# Patient Record
Sex: FEMALE | Race: WHITE | NOT HISPANIC OR LATINO | URBAN - METROPOLITAN AREA
[De-identification: names, ages, dates, MRNs, and addresses within clinical notes are randomized per-mention and may not be internally consistent; named-entity substitution may affect disease eponyms.]

---

## 2017-01-11 ENCOUNTER — OFFICE (AMBULATORY)
Dept: URBAN - METROPOLITAN AREA CLINIC 75 | Facility: CLINIC | Age: 27
End: 2017-01-11

## 2017-01-11 VITALS
SYSTOLIC BLOOD PRESSURE: 120 MMHG | WEIGHT: 178 LBS | HEART RATE: 69 BPM | HEIGHT: 72 IN | DIASTOLIC BLOOD PRESSURE: 72 MMHG

## 2017-01-11 DIAGNOSIS — D68.62 LUPUS ANTICOAGULANT SYNDROME: ICD-10-CM

## 2017-01-11 DIAGNOSIS — K50.90 CROHN'S DISEASE, UNSPECIFIED, WITHOUT COMPLICATIONS: ICD-10-CM

## 2017-01-11 DIAGNOSIS — Z79.01 LONG TERM (CURRENT) USE OF ANTICOAGULANTS: ICD-10-CM

## 2017-01-11 PROCEDURE — 99213 OFFICE O/P EST LOW 20 MIN: CPT | Performed by: INTERNAL MEDICINE

## 2017-01-11 RX ORDER — MESALAMINE 500 MG/1
2000 CAPSULE ORAL
Qty: 180 | Refills: 1 | Status: COMPLETED
End: 2017-01-11

## 2017-01-11 RX ORDER — MESALAMINE 1.2 G/1
2.4 TABLET, DELAYED RELEASE ORAL
Qty: 60 | Refills: 5 | Status: COMPLETED
Start: 2017-01-11 | End: 2017-07-19

## 2017-07-19 ENCOUNTER — OFFICE (AMBULATORY)
Dept: URBAN - METROPOLITAN AREA CLINIC 75 | Facility: CLINIC | Age: 27
End: 2017-07-19
Payer: COMMERCIAL

## 2017-07-19 VITALS
SYSTOLIC BLOOD PRESSURE: 122 MMHG | WEIGHT: 177 LBS | DIASTOLIC BLOOD PRESSURE: 72 MMHG | HEART RATE: 86 BPM | HEIGHT: 72 IN

## 2017-07-19 DIAGNOSIS — Z79.01 LONG TERM (CURRENT) USE OF ANTICOAGULANTS: ICD-10-CM

## 2017-07-19 DIAGNOSIS — K50.90 CROHN'S DISEASE, UNSPECIFIED, WITHOUT COMPLICATIONS: ICD-10-CM

## 2017-07-19 DIAGNOSIS — D68.62 LUPUS ANTICOAGULANT SYNDROME: ICD-10-CM

## 2017-07-19 PROCEDURE — 99213 OFFICE O/P EST LOW 20 MIN: CPT | Performed by: INTERNAL MEDICINE

## 2017-12-19 ENCOUNTER — OFFICE (AMBULATORY)
Dept: URBAN - METROPOLITAN AREA CLINIC 75 | Facility: CLINIC | Age: 27
End: 2017-12-19

## 2017-12-19 VITALS
HEIGHT: 72 IN | SYSTOLIC BLOOD PRESSURE: 118 MMHG | HEART RATE: 84 BPM | WEIGHT: 174 LBS | DIASTOLIC BLOOD PRESSURE: 72 MMHG

## 2017-12-19 DIAGNOSIS — Z79.01 LONG TERM (CURRENT) USE OF ANTICOAGULANTS: ICD-10-CM

## 2017-12-19 DIAGNOSIS — K50.90 CROHN'S DISEASE, UNSPECIFIED, WITHOUT COMPLICATIONS: ICD-10-CM

## 2017-12-19 DIAGNOSIS — D68.62 LUPUS ANTICOAGULANT SYNDROME: ICD-10-CM

## 2017-12-19 PROCEDURE — 99214 OFFICE O/P EST MOD 30 MIN: CPT | Performed by: INTERNAL MEDICINE

## 2017-12-19 NOTE — SERVICEHPINOTES
12/19/2017  OBEY ELLIOTT  [birth year]  1990   was seen in our [facility] office today for a follow up visit.   Comes in with some recently increased symptoms. Is off all of her medicines since June. Have been doing fairly well but not noticed a slight increase in to the isthmus little bit of blood some mucus. No significant abdominal pain. Worried that her symptoms are coming back. She gets  in May. No significant abdominal pain. We reviewed her previous history and previous endoscopic evaluation.      7/19/2017 OBEY ELLIOTT 25 1990 was seen in our [facility] office today for a follow up visit. BRsaw Dr Edouard last month BRdid not tolerate Lialda after 2 weeks {mild constipation/bloating/gas/wt gain} started in place of Pentasa 1/2017 due to cost -- we decided to try IMURAN monotherapy for now on June 1 she self-discontinued the AZA and she feels great better than in many yearsgraduates NP school in 2 weeksBRgetting  May 1239YX8/11/17: I did have a pleasure to see her in the office today she's been doing great she had a bit of a flare last January probably an infection from something that she ate at the time there was concern of listeria infection from some lettuce and she had recently traveled at that time. Since then she's been doing great feeling well. She's busy with school and work and she's not having any bowel issues. No blood in the stool. Weight is stable appetite is good. She tells me that her insurance wants to switch her Pentasa to a different medication and the options are balsalazide, Azulfidine, Apriso or Lialda 7/13/16: I was delighted to see her in the office today she seems to be doing great. Had a bit of a rough patch back in the winter and then insurance did not want to pay for Asacol. Blood work looks great and she seems to be doing well with the Pentasa. Bowel function is good. No abdominal pain and no blood in the stool. Pertinent negative symptoms include abdominal pain, belching, black stools, bloating, change in bowel habits, constipation, diarrhea, difficulty swallowing, painful swallowing, flatulence/rectal gas, heartburn/reflux, mucous in stools, nausea, painful stools, rectal bleeding, rectal protusions, rectal urgency, soiling/incontinence, vomiting. 1/19/16: I did see her in the office today as a work in visit. She's been doing great from the standpoint of her Crohn's disease. Just a few days ago developed severe abdominal pain. This was accompanied by over 20 loose bowel movements. These problems did not have blood the next day with the bowel movements slowed down she diagnosis small amount of mucus and blood on the tissue. She is not seeing any blood since then in [place] much better today. She did eat some sausage and wonders if that could've caused some pain in the bleeding. She has not really had any Crohn's problems for a long time and her colonoscopy in July 2014 looked great. We then slowly dropping down her Imuran dose without any problems. Her last lab work was done to the hematologist last month. She does not recognize any unusual exposures at work she is also going to school for nurse practitioner at [employer] 6/17/15:OBEY ELLIOTT is seen today for a follow-up visit. I was delighted to see her back in the office and she is doing very well. Really no clinical issues since I saw her last. 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 She is on 100 mg of azathioprine and takes 2 doses of Asacol 800 mg twice daily. No signs or symptoms of active Crohn's at this time.Had blood work done last month and tells me her bilirubin is 1.3 but it was 1.4 last year and this is probably because of Gilbert's syndrome BR12/9/15::: OBEY ELLIOTT is seen today for a follow-up visit.Overall doing very well. She has had no evidence of active Crohn's. Her colonoscopy from July looked great. Still, a very small area of active inflammation but 98 percent of her colon looked in remission. We had multiple conversations about continue her azathioprine. Certainly, fertility standpoint in the long-term, we would like to cut down as well as possible perhaps even stopped that. She is now going to be committed to long-term anticoagulation due to her hypercoagulability. Colonoscopy 7/2/145/22/14:::: OBEY ELLIOTT is seen today for a follow-up visit.I was pleased to see her in the office today. She is doing wonderfully well. Her blood work that I did not at her last visit was completely normal. Her bilirubin, however, is 1.4. 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 3/19/14:::CRISSY ELLIOTT is a 23 year old female patient who is seen at the request of [facility] for a consultation/initial visit. 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 She was diagnosed with Crohn's at age 7. She has recently relocated to the [facility] and needs to establish adult GI care. She was previously getting her care from the [facility]. Amazingly, her last colonoscopy was at the time of diagnosis. She was briefly on prednisone, but mainly has been maintained with 5 ASA and Imuran. She has an interesting history of hematologic disorder as outlined below.He is currently asymptomatic from a GI standpoint.She denies any ocular, joint, oral or hepatobiliary manifestations of her IBD. I do not know if she has ever had an inflammatory bowel disease serology. She is nulliparous Pertinent negative symptoms include abdominal pain, abdominal swelling, change in bowel habits, constipation, diarrhea, heartburn, trouble swallowing, red blood in stool, vomiting of blood, rectal pain, jaundice, nausea, incontinence of stool.

## 2018-02-07 VITALS
SYSTOLIC BLOOD PRESSURE: 95 MMHG | SYSTOLIC BLOOD PRESSURE: 92 MMHG | SYSTOLIC BLOOD PRESSURE: 143 MMHG | SYSTOLIC BLOOD PRESSURE: 108 MMHG | RESPIRATION RATE: 24 BRPM | HEART RATE: 64 BPM | RESPIRATION RATE: 13 BRPM | DIASTOLIC BLOOD PRESSURE: 113 MMHG | HEART RATE: 59 BPM | DIASTOLIC BLOOD PRESSURE: 73 MMHG | TEMPERATURE: 99 F | HEART RATE: 57 BPM | OXYGEN SATURATION: 97 % | HEART RATE: 69 BPM | RESPIRATION RATE: 22 BRPM | DIASTOLIC BLOOD PRESSURE: 118 MMHG | DIASTOLIC BLOOD PRESSURE: 57 MMHG | OXYGEN SATURATION: 100 % | OXYGEN SATURATION: 94 % | SYSTOLIC BLOOD PRESSURE: 110 MMHG | HEIGHT: 72 IN | SYSTOLIC BLOOD PRESSURE: 102 MMHG | RESPIRATION RATE: 26 BRPM | DIASTOLIC BLOOD PRESSURE: 50 MMHG | HEART RATE: 63 BPM | RESPIRATION RATE: 23 BRPM | RESPIRATION RATE: 25 BRPM | DIASTOLIC BLOOD PRESSURE: 45 MMHG | WEIGHT: 172 LBS | HEART RATE: 85 BPM | SYSTOLIC BLOOD PRESSURE: 126 MMHG | RESPIRATION RATE: 28 BRPM | DIASTOLIC BLOOD PRESSURE: 80 MMHG | DIASTOLIC BLOOD PRESSURE: 47 MMHG | SYSTOLIC BLOOD PRESSURE: 121 MMHG | RESPIRATION RATE: 16 BRPM | TEMPERATURE: 98.6 F | HEART RATE: 71 BPM | OXYGEN SATURATION: 93 % | HEART RATE: 72 BPM

## 2018-02-09 ENCOUNTER — OFFICE (AMBULATORY)
Dept: URBAN - METROPOLITAN AREA PATHOLOGY 4 | Facility: PATHOLOGY | Age: 28
End: 2018-02-09

## 2018-02-09 ENCOUNTER — AMBULATORY SURGICAL CENTER (AMBULATORY)
Dept: URBAN - METROPOLITAN AREA SURGERY 17 | Facility: SURGERY | Age: 28
End: 2018-02-09
Payer: COMMERCIAL

## 2018-02-09 DIAGNOSIS — K50.10 CROHN'S DISEASE OF LARGE INTESTINE WITHOUT COMPLICATIONS: ICD-10-CM

## 2018-02-09 DIAGNOSIS — K50.90 CROHN'S DISEASE, UNSPECIFIED, WITHOUT COMPLICATIONS: ICD-10-CM

## 2018-02-09 DIAGNOSIS — K52.9 NONINFECTIVE GASTROENTERITIS AND COLITIS, UNSPECIFIED: ICD-10-CM

## 2018-02-09 DIAGNOSIS — K92.2 GASTROINTESTINAL HEMORRHAGE, UNSPECIFIED: ICD-10-CM

## 2018-02-09 DIAGNOSIS — K62.5 HEMORRHAGE OF ANUS AND RECTUM: ICD-10-CM

## 2018-02-09 DIAGNOSIS — D68.62 LUPUS ANTICOAGULANT SYNDROME: ICD-10-CM

## 2018-02-09 DIAGNOSIS — K63.3 ULCER OF INTESTINE: ICD-10-CM

## 2018-02-09 DIAGNOSIS — Z79.01 LONG TERM (CURRENT) USE OF ANTICOAGULANTS: ICD-10-CM

## 2018-02-09 LAB
GI HISTOLOGY: A. SELECT: (no result)
GI HISTOLOGY: B. SELECT: (no result)
GI HISTOLOGY: C. SELECT: (no result)
GI HISTOLOGY: D. SELECT: (no result)
GI HISTOLOGY: E. UNSPECIFIED: (no result)
GI HISTOLOGY: PDF REPORT: (no result)

## 2018-02-09 PROCEDURE — 88305 TISSUE EXAM BY PATHOLOGIST: CPT | Performed by: INTERNAL MEDICINE

## 2018-02-09 PROCEDURE — 45380 COLONOSCOPY AND BIOPSY: CPT | Performed by: INTERNAL MEDICINE

## 2018-02-09 RX ADMIN — PROPOFOL 50 MG: 10 INJECTION, EMULSION INTRAVENOUS at 12:39

## 2018-02-09 RX ADMIN — PROPOFOL 100 MG: 10 INJECTION, EMULSION INTRAVENOUS at 12:39

## 2018-02-09 RX ADMIN — PROPOFOL 25 MG: 10 INJECTION, EMULSION INTRAVENOUS at 12:45

## 2018-02-09 RX ADMIN — PROPOFOL 50 MG: 10 INJECTION, EMULSION INTRAVENOUS at 12:47

## 2018-02-09 RX ADMIN — PROPOFOL 25 MG: 10 INJECTION, EMULSION INTRAVENOUS at 12:40

## 2018-02-09 RX ADMIN — PROPOFOL 50 MG: 10 INJECTION, EMULSION INTRAVENOUS at 12:41

## 2018-02-09 RX ADMIN — PROPOFOL 50 MG: 10 INJECTION, EMULSION INTRAVENOUS at 12:50

## 2018-02-09 RX ADMIN — LIDOCAINE HYDROCHLORIDE 25 MG: 10 INJECTION, SOLUTION EPIDURAL; INFILTRATION; INTRACAUDAL; PERINEURAL at 12:39

## 2018-02-09 RX ADMIN — PROPOFOL 50 MG: 10 INJECTION, EMULSION INTRAVENOUS at 12:43

## 2018-08-15 ENCOUNTER — OFFICE (AMBULATORY)
Dept: URBAN - METROPOLITAN AREA CLINIC 75 | Facility: CLINIC | Age: 28
End: 2018-08-15

## 2018-08-15 VITALS
HEIGHT: 72 IN | SYSTOLIC BLOOD PRESSURE: 118 MMHG | DIASTOLIC BLOOD PRESSURE: 70 MMHG | WEIGHT: 186 LBS | HEART RATE: 72 BPM

## 2018-08-15 DIAGNOSIS — K62.5 HEMORRHAGE OF ANUS AND RECTUM: ICD-10-CM

## 2018-08-15 DIAGNOSIS — D68.62 LUPUS ANTICOAGULANT SYNDROME: ICD-10-CM

## 2018-08-15 DIAGNOSIS — Z79.01 LONG TERM (CURRENT) USE OF ANTICOAGULANTS: ICD-10-CM

## 2018-08-15 DIAGNOSIS — K50.90 CROHN'S DISEASE, UNSPECIFIED, WITHOUT COMPLICATIONS: ICD-10-CM

## 2018-08-15 PROCEDURE — 99215 OFFICE O/P EST HI 40 MIN: CPT | Performed by: INTERNAL MEDICINE

## 2018-08-15 RX ORDER — BUDESONIDE 3 MG/1
9 CAPSULE ORAL
Qty: 90 | Refills: 3 | Status: ACTIVE
Start: 2018-08-15

## 2019-06-04 ENCOUNTER — OFFICE (AMBULATORY)
Dept: URBAN - METROPOLITAN AREA CLINIC 75 | Facility: CLINIC | Age: 29
End: 2019-06-04

## 2019-06-04 VITALS
SYSTOLIC BLOOD PRESSURE: 120 MMHG | DIASTOLIC BLOOD PRESSURE: 68 MMHG | HEIGHT: 72 IN | HEART RATE: 63 BPM | WEIGHT: 185 LBS

## 2019-06-04 DIAGNOSIS — K50.10 CROHN'S DISEASE OF LARGE INTESTINE WITHOUT COMPLICATIONS: ICD-10-CM

## 2019-06-04 DIAGNOSIS — Z79.01 LONG TERM (CURRENT) USE OF ANTICOAGULANTS: ICD-10-CM

## 2019-06-04 DIAGNOSIS — K63.3 ULCER OF INTESTINE: ICD-10-CM

## 2019-06-04 DIAGNOSIS — K62.5 HEMORRHAGE OF ANUS AND RECTUM: ICD-10-CM

## 2019-06-04 DIAGNOSIS — K92.2 GASTROINTESTINAL HEMORRHAGE, UNSPECIFIED: ICD-10-CM

## 2019-06-04 DIAGNOSIS — D68.62 LUPUS ANTICOAGULANT SYNDROME: ICD-10-CM

## 2019-06-04 DIAGNOSIS — Z92.25 PERSONAL HISTORY OF IMMUNOSUPPRESSION THERAPY: ICD-10-CM

## 2019-06-04 PROCEDURE — 99213 OFFICE O/P EST LOW 20 MIN: CPT | Performed by: NURSE PRACTITIONER

## 2019-06-04 RX ORDER — BUDESONIDE 28 MG/1
8 AEROSOL, FOAM RECTAL
Qty: 33.4 | Refills: 6 | Status: COMPLETED
Start: 2019-06-04 | End: 2019-09-18

## 2019-09-18 ENCOUNTER — OFFICE (AMBULATORY)
Dept: URBAN - METROPOLITAN AREA CLINIC 75 | Facility: CLINIC | Age: 29
End: 2019-09-18

## 2019-09-18 VITALS
HEIGHT: 72 IN | SYSTOLIC BLOOD PRESSURE: 119 MMHG | HEART RATE: 67 BPM | DIASTOLIC BLOOD PRESSURE: 78 MMHG | WEIGHT: 188 LBS

## 2019-09-18 DIAGNOSIS — K50.10 CROHN'S DISEASE OF LARGE INTESTINE WITHOUT COMPLICATIONS: ICD-10-CM

## 2019-09-18 DIAGNOSIS — Z79.01 LONG TERM (CURRENT) USE OF ANTICOAGULANTS: ICD-10-CM

## 2019-09-18 DIAGNOSIS — D68.62 LUPUS ANTICOAGULANT SYNDROME: ICD-10-CM

## 2019-09-18 PROCEDURE — 99213 OFFICE O/P EST LOW 20 MIN: CPT | Performed by: INTERNAL MEDICINE

## 2021-05-25 VITALS
DIASTOLIC BLOOD PRESSURE: 66 MMHG | HEART RATE: 92 BPM | HEIGHT: 72 IN | WEIGHT: 184 LBS | TEMPERATURE: 97.5 F | OXYGEN SATURATION: 99 % | SYSTOLIC BLOOD PRESSURE: 118 MMHG

## 2021-05-26 ENCOUNTER — OFFICE (AMBULATORY)
Dept: URBAN - METROPOLITAN AREA CLINIC 75 | Facility: CLINIC | Age: 31
End: 2021-05-26

## 2021-05-26 DIAGNOSIS — Z92.25 PERSONAL HISTORY OF IMMUNOSUPPRESSION THERAPY: ICD-10-CM

## 2021-05-26 DIAGNOSIS — Z79.01 LONG TERM (CURRENT) USE OF ANTICOAGULANTS: ICD-10-CM

## 2021-05-26 DIAGNOSIS — K50.10 CROHN'S DISEASE OF LARGE INTESTINE WITHOUT COMPLICATIONS: ICD-10-CM

## 2021-05-26 DIAGNOSIS — D68.62 LUPUS ANTICOAGULANT SYNDROME: ICD-10-CM

## 2021-05-26 PROCEDURE — 99213 OFFICE O/P EST LOW 20 MIN: CPT | Performed by: INTERNAL MEDICINE

## 2021-06-05 PROBLEM — K50.10 CROHN'S DISEASE OF LARGE INTESTINE WITHOUT COMPLICATIONS: Status: ACTIVE | Noted: 2018-02-09
